# Patient Record
(demographics unavailable — no encounter records)

---

## 2025-07-02 NOTE — ASSESSMENT
[FreeTextEntry1] : Data reviewed:  PFT 2/2023: FVC 1.99L (59%), FEV1 1.66L (65%), TLC 3.72L (63%), DLCO 21.39 (98%)  Impression: ZACH using CPAP - needs to transfer care S/p R diaphragm imbrication Dr Herr (5/22/2023) for R hemidiaphragm elevation  Plan: Repeat HST without CPAP for one night. Call us back w [employer] and we can order new supplies for him.

## 2025-07-02 NOTE — CONSULT LETTER
[Dear  ___] : Dear  [unfilled], [Courtesy Letter:] : I had the pleasure of seeing your patient, [unfilled], in my office today. [Please see my note below.] : Please see my note below. [Consult Closing:] : Thank you very much for allowing me to participate in the care of this patient.  If you have any questions, please do not hesitate to contact me. [Sincerely,] : Sincerely, [FreeTextEntry2] : Jl Membreno, DO [address] [place] [address]  [FreeTextEntry3] : Tawana Montilla MD, [facility]

## 2025-07-02 NOTE — HISTORY OF PRESENT ILLNESS
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 He underwent imbrication by Dr Herr 5/2023 with relief of his dyspnea.  11/17/2023 [Martin]: Referred by Dr. Demar Goetz for preop risk assessment for R knee arthroplasty. Patient dyspnea has resolved since his surgery. Uncomplicated post-op course. He had prior shoulder surgery, also uncomplicated post-op course. He feels physically well, no encumbered by his breathing. However his wife recently passed away,  >40 years. Notably he lives alone, he says he does not have anyone who can help following the surgery. Received flu shot this season.   7/2/2025: Sent back today by Dr Membreno; he is not sure why. He had his R TKR without complication but he still has the pain. His breathing is ok. He thinks he may be back for his sleep apnea. He says the other doctor who took care of his ZACH is too busy to see him. He uses CPAP every night with relief. He does not know his DME company, his previous sleep doc, or where he had his sleep test. His machine is 4 years old, working fine. Needs new supplies. He has the name of his [employer] company at home. [ESS] : 2